# Patient Record
Sex: FEMALE | Race: WHITE | NOT HISPANIC OR LATINO | Employment: PART TIME | URBAN - METROPOLITAN AREA
[De-identification: names, ages, dates, MRNs, and addresses within clinical notes are randomized per-mention and may not be internally consistent; named-entity substitution may affect disease eponyms.]

---

## 2018-07-26 ENCOUNTER — OFFICE VISIT (OUTPATIENT)
Dept: NEUROLOGY | Facility: CLINIC | Age: 43
End: 2018-07-26

## 2018-07-26 VITALS
WEIGHT: 183 LBS | HEIGHT: 62 IN | DIASTOLIC BLOOD PRESSURE: 81 MMHG | SYSTOLIC BLOOD PRESSURE: 122 MMHG | BODY MASS INDEX: 33.68 KG/M2 | OXYGEN SATURATION: 98 % | HEART RATE: 100 BPM

## 2018-07-26 DIAGNOSIS — R20.2 PARESTHESIA: Primary | ICD-10-CM

## 2018-07-26 DIAGNOSIS — R53.1 WEAKNESS: ICD-10-CM

## 2018-07-26 PROCEDURE — 99204 OFFICE O/P NEW MOD 45 MIN: CPT | Performed by: NURSE PRACTITIONER

## 2018-07-26 RX ORDER — FLUOXETINE HYDROCHLORIDE 20 MG/1
20 CAPSULE ORAL DAILY
COMMUNITY

## 2018-07-26 RX ORDER — OXYCODONE HYDROCHLORIDE 10 MG/1
TABLET ORAL
COMMUNITY
Start: 2018-07-24

## 2018-07-26 RX ORDER — ACYCLOVIR 400 MG/1
TABLET ORAL
COMMUNITY
Start: 2018-07-14

## 2018-07-26 RX ORDER — TRIAMTERENE AND HYDROCHLOROTHIAZIDE 37.5; 25 MG/1; MG/1
1 CAPSULE ORAL EVERY MORNING
COMMUNITY

## 2018-07-26 RX ORDER — HYDROXYZINE PAMOATE 25 MG/1
1 CAPSULE ORAL DAILY
COMMUNITY
Start: 2018-06-12

## 2018-07-26 RX ORDER — MORPHINE SULFATE 15 MG/1
1 TABLET, FILM COATED, EXTENDED RELEASE ORAL DAILY
COMMUNITY
Start: 2018-07-24

## 2018-07-26 RX ORDER — FOLIC ACID 1 MG/1
1 TABLET ORAL DAILY
COMMUNITY
Start: 2018-07-24 | End: 2019-07-24

## 2018-08-14 ENCOUNTER — OFFICE VISIT (OUTPATIENT)
Dept: NEUROLOGY | Facility: CLINIC | Age: 43
End: 2018-08-14

## 2018-08-14 VITALS
OXYGEN SATURATION: 98 % | DIASTOLIC BLOOD PRESSURE: 80 MMHG | HEIGHT: 62 IN | SYSTOLIC BLOOD PRESSURE: 128 MMHG | BODY MASS INDEX: 34.04 KG/M2 | WEIGHT: 185 LBS | HEART RATE: 72 BPM

## 2018-08-14 DIAGNOSIS — Z53.21 PATIENT LEFT WITHOUT BEING SEEN: Primary | ICD-10-CM

## 2018-08-21 ENCOUNTER — HOSPITAL ENCOUNTER (OUTPATIENT)
Dept: MRI IMAGING | Facility: HOSPITAL | Age: 43
Discharge: HOME | End: 2018-08-21
Attending: NURSE PRACTITIONER

## 2018-08-28 ENCOUNTER — HOSPITAL ENCOUNTER (OUTPATIENT)
Dept: MRI IMAGING | Facility: HOSPITAL | Age: 43
Discharge: HOME | End: 2018-08-28
Attending: NURSE PRACTITIONER | Admitting: NURSE PRACTITIONER

## 2018-08-28 PROCEDURE — 0 GADOBENATE DIMEGLUMINE 529 MG/ML SOLUTION: Performed by: NURSE PRACTITIONER

## 2018-08-28 PROCEDURE — A9577 INJ MULTIHANCE: HCPCS | Performed by: NURSE PRACTITIONER

## 2018-08-28 PROCEDURE — 70553 MRI BRAIN STEM W/O & W/DYE: CPT

## 2018-08-28 RX ADMIN — GADOBENATE DIMEGLUMINE 17 ML: 529 INJECTION, SOLUTION INTRAVENOUS at 09:40

## 2018-08-29 ENCOUNTER — OFFICE VISIT (OUTPATIENT)
Dept: NEUROLOGY | Facility: CLINIC | Age: 43
End: 2018-08-29

## 2018-08-29 VITALS
HEART RATE: 101 BPM | WEIGHT: 184 LBS | HEIGHT: 62 IN | BODY MASS INDEX: 33.86 KG/M2 | DIASTOLIC BLOOD PRESSURE: 78 MMHG | OXYGEN SATURATION: 98 % | SYSTOLIC BLOOD PRESSURE: 118 MMHG

## 2018-08-29 DIAGNOSIS — G89.29 OTHER CHRONIC PAIN: ICD-10-CM

## 2018-08-29 DIAGNOSIS — R20.2 PARESTHESIAS: Primary | ICD-10-CM

## 2018-08-29 PROCEDURE — 99213 OFFICE O/P EST LOW 20 MIN: CPT | Performed by: NURSE PRACTITIONER

## 2019-02-25 ENCOUNTER — APPOINTMENT (OUTPATIENT)
Dept: LAB | Facility: HOSPITAL | Age: 44
End: 2019-02-25

## 2019-02-25 ENCOUNTER — OFFICE VISIT (OUTPATIENT)
Dept: NEUROLOGY | Facility: CLINIC | Age: 44
End: 2019-02-25

## 2019-02-25 VITALS
HEIGHT: 62 IN | SYSTOLIC BLOOD PRESSURE: 126 MMHG | WEIGHT: 191 LBS | BODY MASS INDEX: 35.15 KG/M2 | DIASTOLIC BLOOD PRESSURE: 74 MMHG

## 2019-02-25 DIAGNOSIS — R51.9 FACIAL PAIN: Primary | ICD-10-CM

## 2019-02-25 DIAGNOSIS — R29.2 HYPER REFLEXIA: ICD-10-CM

## 2019-02-25 LAB
ALBUMIN SERPL-MCNC: 4.6 G/DL (ref 3.2–4.8)
ALBUMIN/GLOB SERPL: 1.9 G/DL (ref 1.5–2.5)
ALP SERPL-CCNC: 118 U/L (ref 25–100)
ALT SERPL W P-5'-P-CCNC: 32 U/L (ref 7–40)
ANION GAP SERPL CALCULATED.3IONS-SCNC: 5 MMOL/L (ref 3–11)
AST SERPL-CCNC: 23 U/L (ref 0–33)
BASOPHILS # BLD AUTO: 0.03 10*3/MM3 (ref 0–0.2)
BASOPHILS NFR BLD AUTO: 0.3 % (ref 0–1)
BILIRUB SERPL-MCNC: 0.6 MG/DL (ref 0.3–1.2)
BUN BLD-MCNC: 11 MG/DL (ref 9–23)
BUN/CREAT SERPL: 15.3 (ref 7–25)
CALCIUM SPEC-SCNC: 9.4 MG/DL (ref 8.7–10.4)
CHLORIDE SERPL-SCNC: 103 MMOL/L (ref 99–109)
CO2 SERPL-SCNC: 28 MMOL/L (ref 20–31)
CREAT BLD-MCNC: 0.72 MG/DL (ref 0.6–1.3)
CRP SERPL-MCNC: 0.62 MG/DL (ref 0–1)
DEPRECATED RDW RBC AUTO: 51.9 FL (ref 37–54)
EOSINOPHIL # BLD AUTO: 0.05 10*3/MM3 (ref 0–0.3)
EOSINOPHIL NFR BLD AUTO: 0.6 % (ref 0–3)
ERYTHROCYTE [DISTWIDTH] IN BLOOD BY AUTOMATED COUNT: 13.7 % (ref 11.3–14.5)
ERYTHROCYTE [SEDIMENTATION RATE] IN BLOOD: 21 MM/HR (ref 0–20)
FOLATE SERPL-MCNC: >24 NG/ML (ref 3.2–20)
GFR SERPL CREATININE-BSD FRML MDRD: 88 ML/MIN/1.73
GLOBULIN UR ELPH-MCNC: 2.4 GM/DL
GLUCOSE BLD-MCNC: 90 MG/DL (ref 70–100)
HCT VFR BLD AUTO: 49.5 % (ref 34.5–44)
HGB BLD-MCNC: 16.5 G/DL (ref 11.5–15.5)
IMM GRANULOCYTES # BLD AUTO: 0.03 10*3/MM3 (ref 0–0.05)
IMM GRANULOCYTES NFR BLD AUTO: 0.3 % (ref 0–0.6)
LYMPHOCYTES # BLD AUTO: 2.54 10*3/MM3 (ref 0.6–4.8)
LYMPHOCYTES NFR BLD AUTO: 28.6 % (ref 24–44)
MCH RBC QN AUTO: 34.2 PG (ref 27–31)
MCHC RBC AUTO-ENTMCNC: 33.3 G/DL (ref 32–36)
MCV RBC AUTO: 102.7 FL (ref 80–99)
MONOCYTES # BLD AUTO: 0.57 10*3/MM3 (ref 0–1)
MONOCYTES NFR BLD AUTO: 6.4 % (ref 0–12)
NEUTROPHILS # BLD AUTO: 5.7 10*3/MM3 (ref 1.5–8.3)
NEUTROPHILS NFR BLD AUTO: 64.1 % (ref 41–71)
PLATELET # BLD AUTO: 256 10*3/MM3 (ref 150–450)
PMV BLD AUTO: 10.7 FL (ref 6–12)
POTASSIUM BLD-SCNC: 3.6 MMOL/L (ref 3.5–5.5)
PROT SERPL-MCNC: 7 G/DL (ref 5.7–8.2)
RBC # BLD AUTO: 4.82 10*6/MM3 (ref 3.89–5.14)
SODIUM BLD-SCNC: 136 MMOL/L (ref 132–146)
VIT B12 BLD-MCNC: 552 PG/ML (ref 211–911)
WBC NRBC COR # BLD: 8.89 10*3/MM3 (ref 3.5–10.8)

## 2019-02-25 PROCEDURE — 82746 ASSAY OF FOLIC ACID SERUM: CPT | Performed by: NURSE PRACTITIONER

## 2019-02-25 PROCEDURE — 99214 OFFICE O/P EST MOD 30 MIN: CPT | Performed by: NURSE PRACTITIONER

## 2019-02-25 PROCEDURE — 80053 COMPREHEN METABOLIC PANEL: CPT | Performed by: NURSE PRACTITIONER

## 2019-02-25 PROCEDURE — 85652 RBC SED RATE AUTOMATED: CPT | Performed by: NURSE PRACTITIONER

## 2019-02-25 PROCEDURE — 86140 C-REACTIVE PROTEIN: CPT | Performed by: NURSE PRACTITIONER

## 2019-02-25 PROCEDURE — 82607 VITAMIN B-12: CPT | Performed by: NURSE PRACTITIONER

## 2019-02-25 PROCEDURE — 83921 ORGANIC ACID SINGLE QUANT: CPT | Performed by: NURSE PRACTITIONER

## 2019-02-25 PROCEDURE — 85025 COMPLETE CBC W/AUTO DIFF WBC: CPT | Performed by: NURSE PRACTITIONER

## 2019-02-25 PROCEDURE — 36415 COLL VENOUS BLD VENIPUNCTURE: CPT | Performed by: NURSE PRACTITIONER

## 2019-02-25 RX ORDER — CARBAMAZEPINE 200 MG/1
TABLET ORAL
Qty: 60 TABLET | Refills: 5 | Status: SHIPPED | OUTPATIENT
Start: 2019-02-25

## 2019-02-25 NOTE — PROGRESS NOTES
Subjective:     Patient ID: Annette Ricketts is a 43 y.o. female.    CC:   Chief Complaint   Patient presents with   • Numbness       HPI:   History of Present Illness     Ms. Ricketts is here today for follow-up.  When I first began seeing her in July 2018 she came to our clinic for a second opinion.   She was previously seeing another neurologist for quite some time.  She told me that her symptoms began approximately 7-8 years ago when she was diagnosed with optic neuritis, she's had 3-4 bouts of optic neuritis over the years.  She reported along the same time that she started having vision issues she developed weakness and numbness in her right face, arm, leg and foot.  She reported that at times she can't feel anything at all at other times she feels a hot sensation on one or the other side of her body.  She also endorsed generalized muscle weakness and whole-body fatigue.  She reports numbness, burning, cramping and weakness of all of her muscles, symptoms do increase when she gets hot or when she has had not had enough sleep   She denied urinary incontinence,   She does have a history of chronic pain, she is currently on morphine sulfate 15 mg twice a day along with oxycodone 10 mg 3 times a day for chronic trigeminal neuralgia, she's been on these medicines for greater than 10 years.     2 weeks prior to first seeing me  she was sent to neuro-ophthalmology at  by another eye doctor, she saw Dr. Last, who ordered numerous lab tests all negative w/ excpetion of low folate, NMO negative) as well as MR of her orbit face and neck with and without contrast This scan was  done June 25, 2018 showing optic nerves to be mildly small there is a subtle increased signal within both optic nerves which could be due to past optic neuritis or other inflammatory process of note there is also increased signal in the T2 weighted images within the simone that were reportedly possibly related to chronic ischemic changes greater  "than expected in her age. MRI brain performed here showing no significant white matter disease, read as normal by radiology.   Further prior workup includes nerve conduction studies done of the lower extremities 1/22/2018 and read as normal no no suggestive nerve compromise.  Nerve conduction studies of the upper extremities recently done March 28, 2018 shows mild right carpal tunnel, otherwise unremarkable.  MRI of the brain obtained last in February 2016 read as normal with no demyelinating lesions noted, positive finding only for mucosal thickening of the left frontal scan right sphenoid sinusitis   She had been sent to rheumatology as well, they have told her symptoms are likely from fibromyalgia, she is very frustrated with this diagnosis.  She has also been seen at [facility] about 7-8 years ago, she states that lumbar puncture was done and \"normal\".  She is very concerned as her father does have multiple sclerosis.    I had easily attempted twice to get cervical spine MRI approved and this was denied twice.  Today she comes to me with complaints of left face electrical sensation from the lip extending up the chin to the cheek area 1 month ago.  She reports that this would occur for 5-10 minutes at a time intermittently, this lasted for about 4-5 days and resolved.  Last week she had a typical bout of trigeminal neuralgia in the right face described as a sensation starting behind the ear and leading to the entire right side of the face and described as a shooting electrical sensation as well.  She does currently take MS Contin as well as oxycodone which did not touch this type of pain, she did have some mild relief with high-dose ibuprofen 2-3 times a day.  This is improving, she has some mild residual tingling in the cheek.    Has never been on any daily medicines for traveled geminal neuralgia, she was started many years ago on MS Contin and oxycodone for this pain but again has never taken Trileptal or " Tegretol      The following portions of the patient's history were reviewed and updated as appropriate: allergies, current medications, past family history, past medical history, past social history, past surgical history and problem list.    Past Medical History:   Diagnosis Date   • CTS (carpal tunnel syndrome)    • Fibromyalgia, primary    • Migraine    • Trigeminal neuralgia    • Vision loss     optic neuritis       Past Surgical History:   Procedure Laterality Date   • KNEE SURGERY  1988   • UMBILICAL HERNIA REPAIR  1993       Social History     Socioeconomic History   • Marital status: Single     Spouse name: Not on file   • Number of children: Not on file   • Years of education: Not on file   • Highest education level: Not on file   Social Needs   • Financial resource strain: Not on file   • Food insecurity - worry: Not on file   • Food insecurity - inability: Not on file   • Transportation needs - medical: Not on file   • Transportation needs - non-medical: Not on file   Occupational History   • Not on file   Tobacco Use   • Smoking status: Current Every Day Smoker   • Smokeless tobacco: Never Used   Substance and Sexual Activity   • Alcohol use: Not on file   • Drug use: No   • Sexual activity: Not on file   Other Topics Concern   • Not on file   Social History Narrative   • Not on file       Family History   Problem Relation Age of Onset   • Neuropathy Mother    • Stroke Mother    • Diabetes Mother    • Migraines Mother    • Hypertension Father    • Neuropathy Father    • Heart disease Maternal Grandfather         Review of Systems   Constitutional: Negative.    HENT: Positive for ear pain. Negative for congestion, dental problem, drooling, ear discharge, facial swelling, hearing loss, nosebleeds, postnasal drip, rhinorrhea, sinus pressure, sinus pain, sneezing, sore throat, tinnitus, trouble swallowing and voice change.    Eyes: Negative.    Respiratory: Negative.    Cardiovascular: Negative.     Gastrointestinal: Negative.    Endocrine: Negative.    Genitourinary: Negative.    Musculoskeletal: Negative.    Skin: Negative.    Allergic/Immunologic: Negative.    Neurological: Positive for numbness and headaches. Negative for dizziness, tremors, seizures, syncope, facial asymmetry, speech difficulty, weakness and light-headedness.        FACIAL PAIN   Hematological: Negative.    Psychiatric/Behavioral: Negative.         Objective:    Neurologic Exam     Mental Status   Oriented to person, place, and time.   Attention: normal. Concentration: normal.   Speech: speech is normal   Level of consciousness: alert  Knowledge: consistent with education.   Able to read. Able to write. Normal comprehension.     Cranial Nerves   Cranial nerves II through XII intact.     CN III, IV, VI   Pupils are equal, round, and reactive to light.  Extraocular motions are normal.     Motor Exam   Muscle bulk: normal  Overall muscle tone: normal    Strength   Strength 5/5 throughout.     Sensory Exam   Mild decreased sensation of the left upper cheek area around the maxillary sinus region     Gait, Coordination, and Reflexes     Gait  Gait: normal    Reflexes   Right brachioradialis: 3+  Left brachioradialis: 3+  Right biceps: 3+  Left biceps: 3+  Right triceps: 3+  Left triceps: 3+  Right patellar: 2+  Left patellar: 2+  Right achilles: 2+  Left achilles: 2+  Right plantar: normal  Left plantar: normal  Right Arboleda: absent  Left Arboleda: absent      Physical Exam   Constitutional: She is oriented to person, place, and time. She appears well-developed and well-nourished. No distress.   HENT:   Head: Normocephalic and atraumatic.   Eyes: EOM are normal. Pupils are equal, round, and reactive to light.   Neck: Normal range of motion.   Cardiovascular: Normal rate.   Neurological: She is alert and oriented to person, place, and time. She has normal strength. Gait normal.   Reflex Scores:       Tricep reflexes are 3+ on the right side and  3+ on the left side.       Bicep reflexes are 3+ on the right side and 3+ on the left side.       Brachioradialis reflexes are 3+ on the right side and 3+ on the left side.       Patellar reflexes are 2+ on the right side and 2+ on the left side.       Achilles reflexes are 2+ on the right side and 2+ on the left side.  Psychiatric: Her speech is normal.   Vitals reviewed.      Assessment/Plan:       Annette was seen today for numbness.    Diagnoses and all orders for this visit:    Facial pain  -     C-reactive Protein  -     Sedimentation Rate  -     Comprehensive Metabolic Panel  -     CBC & Differential  -     Methylmalonic Acid, Serum  -     Vitamin B12  -     Folate  -     carBAMazepine (TEGretol) 200 MG tablet; Take 1/2 pill PO BID, may increase to whole pill BID after 2 weeks  -     CBC Auto Differential    Hyper reflexia  -     MRI Cervical Spine Without Contrast    Facial symptoms seem consistent with trigeminal neuralgia however this is been on both sides of the face, recent MRI scan the end of August 2018 showed no evidence of any white matter change or enhancing lesions.  She does have a history of trigeminal neuralgia in the past and this is actually why she is on high dose pain medications to this day.  For now we are going to try her on some Tegretol, will start low dose at 100 mg twice a day and she may increase to 200 mg twice a day after 2 weeks if needed.  I did review her [facility] lab results ordered through Dr. Last, and NMO negative, essentially all other stable with the exception of a low folate level, she does tell me that she took folic acid supplementation for a couple months but is not currently taking this.  I will check the above-noted labs, I will see her back in about 1 month, she will call me if needed.  I have asked her to call and give me an update in a few weeks after she starts the Tegretol to let me know how her symptoms are at that point.  Reviewed medications, potential side  effects and signs and symptoms to report. Discussed risk versus benefits of treatment plan with patient and/or family-including medications, labs and radiology that may be ordered. Addressed questions and concerns during visit. Patient and/or family verbalized understanding and agree with plan.  EMR Dragon/Transcription Disclaimer:  Much of this encounter note is an electronic transcription of spoken language to printed text. Electronic transcription of spoken language may permit erroneous words or phrases to be inadvertently transcribed. Although I have reviewed the note for such errors, some may still exist in this documentation.           Kayce Crabtree, APRN  2/25/2019

## 2019-02-26 ENCOUNTER — TELEPHONE (OUTPATIENT)
Dept: NEUROLOGY | Facility: CLINIC | Age: 44
End: 2019-02-26

## 2019-02-26 NOTE — PROGRESS NOTES
Please let her know her folate level is high  B12 level in normal range  CRP normal, sed rate mildly high which is non specific, these findings do not suggest temporal arteritis   Her hemoglobin and hematocrit are elevated.  This can be seen in smokers but needs recheck.  This can be done through PCP, fax copy of labs to her PCP please

## 2019-02-26 NOTE — TELEPHONE ENCOUNTER
----- Message from CAROLINE Montgomery sent at 2/26/2019  7:59 AM EST -----  Please let her know her folate level is high  B12 level in normal range  CRP normal, sed rate mildly high which is non specific, these findings do not suggest temporal arteritis   Her hemoglobin and hematocrit are elevated.  This can be seen in smokers but needs recheck.  This can be done through PCP, fax copy of labs to her PCP please

## 2019-02-28 NOTE — TELEPHONE ENCOUNTER
Pt is informed and verbalizes understanding,.  She would like to ask if she should stop the Folic Acid that she is taking.  Labs have been faxed to pcp.

## 2019-03-01 LAB
Lab: NORMAL
METHYLMALONATE SERPL-SCNC: 178 NMOL/L (ref 0–378)

## 2019-03-25 ENCOUNTER — TELEPHONE (OUTPATIENT)
Dept: NEUROLOGY | Facility: CLINIC | Age: 44
End: 2019-03-25

## 2019-04-23 ENCOUNTER — OFFICE VISIT (OUTPATIENT)
Dept: NEUROLOGY | Facility: CLINIC | Age: 44
End: 2019-04-23

## 2019-04-23 VITALS
HEART RATE: 105 BPM | OXYGEN SATURATION: 98 % | HEIGHT: 62 IN | SYSTOLIC BLOOD PRESSURE: 158 MMHG | WEIGHT: 190 LBS | DIASTOLIC BLOOD PRESSURE: 98 MMHG | BODY MASS INDEX: 34.96 KG/M2

## 2019-04-23 DIAGNOSIS — G50.0 TRIGEMINAL NEURALGIA OF RIGHT SIDE OF FACE: Primary | ICD-10-CM

## 2019-04-23 DIAGNOSIS — R29.2 HYPERREFLEXIA: ICD-10-CM

## 2019-04-23 PROCEDURE — 99214 OFFICE O/P EST MOD 30 MIN: CPT | Performed by: NURSE PRACTITIONER

## 2019-04-23 RX ORDER — PREDNISONE 10 MG/1
TABLET ORAL
Qty: 20 TABLET | Refills: 1 | Status: SHIPPED | OUTPATIENT
Start: 2019-04-23 | End: 2019-07-24

## 2019-04-23 NOTE — PROGRESS NOTES
Subjective:     Patient ID: Annette Ricketts is a 44 y.o. female.    CC:   Chief Complaint   Patient presents with   • Facial Pain       HPI:   History of Present Illness     Ms. Ricketts is here today for follow-up.  When I first began seeing her in July 2018 she came to our clinic for a second opinion.   She was previously seeing another neurologist for quite some time.  She told me that her symptoms began approximately 7-8 years ago when she was diagnosed with optic neuritis, she's had 3-4 bouts of optic neuritis over the years.  She reported along the same time that she started having vision issues she developed weakness and numbness in her right face, arm, leg and foot.  She reported that at times she can't feel anything at all at other times she feels a hot sensation on one or the other side of her body.  She also endorsed generalized muscle weakness and whole-body fatigue.  She reports numbness, burning, cramping and weakness of all of her muscles, symptoms do increase when she gets hot or when she has had not had enough sleep   She denied urinary incontinence,   She does have a history of chronic pain, she is currently on morphine sulfate 15 mg twice a day along with oxycodone 10 mg 3 times a day for chronic trigeminal neuralgia, she's been on these medicines for greater than 10 years.     2 weeks prior to first seeing me  she was sent to neuro-ophthalmology at  by another eye doctor, she saw Dr. Last, who ordered numerous lab tests all negative w/ excpetion of low folate, NMO negative) as well as MR of her orbit face and neck with and without contrast This scan was  done June 25, 2018 showing optic nerves to be mildly small there is a subtle increased signal within both optic nerves which could be due to past optic neuritis or other inflammatory process of note there is also increased signal in the T2 weighted images within the simone that were reportedly possibly related to chronic ischemic changes  "greater than expected in her age. MRI brain performed here showing no significant white matter disease, read as normal by radiology.   Further prior workup includes nerve conduction studies done of the lower extremities 1/22/2018 and read as normal no no suggestive nerve compromise.  Nerve conduction studies of the upper extremities recently done March 28, 2018 shows mild right carpal tunnel, otherwise unremarkable.  MRI of the brain obtained last in Oct 2018 read as normal with no demyelinating lesions noted, positive finding only for mucosal thickening of the left frontal scan right sphenoid sinusitis   She had been sent to rheumatology as well, they have told her symptoms are likely from fibromyalgia, she is very frustrated with this diagnosis.  She has also been seen at [facility] about 7-8 years ago, she states that lumbar puncture was done and \"normal\".  She is very concerned as her father does have multiple sclerosis.     I had previously attempted twice to get cervical spine MRI approved and this was denied twice. AFter last visit this was approved however pt missed MRI appt on March 3rd.    Today she comes to me for follow up on left face electrical sensation from the lip extending up the chin to the cheek area.  She reports that this would occur for 5-10 minutes at a time intermittently, this lasted for about 4-5 days and resolved. .  She does currently take MS Contin as well as oxycodone which did not touch this type of pain, she did have some mild relief with high-dose ibuprofen 2-3 times a day.     Has never been on any daily medicines for trigeminal neuralgia, she was started many years ago on MS Contin and oxycodone for this pain but again has never taken Trileptal or Tegretol.  At last visit I did start Tegretol with instruction on upper titration, she tells me she took this for 2 weeks and felt better so she stopped the medication.  She has since had recurrent bouts of right facial pain consistent " with trigeminal neuralgia.  She had no adverse side effects from the Tegretol, she did start this back about a week ago.     The following portions of the patient's history were reviewed and updated as appropriate: allergies, current medications, past family history, past medical history, past social history, past surgical history and problem list.    Past Medical History:   Diagnosis Date   • CTS (carpal tunnel syndrome)    • Fibromyalgia, primary    • Migraine    • Trigeminal neuralgia    • Vision loss     optic neuritis       Past Surgical History:   Procedure Laterality Date   • KNEE SURGERY  1988   • UMBILICAL HERNIA REPAIR  1993       Social History     Socioeconomic History   • Marital status: Single     Spouse name: Not on file   • Number of children: Not on file   • Years of education: Not on file   • Highest education level: Not on file   Tobacco Use   • Smoking status: Current Every Day Smoker   • Smokeless tobacco: Never Used   Substance and Sexual Activity   • Drug use: No       Family History   Problem Relation Age of Onset   • Neuropathy Mother    • Stroke Mother    • Diabetes Mother    • Migraines Mother    • Hypertension Father    • Neuropathy Father    • Heart disease Maternal Grandfather         Review of Systems   Constitutional: Negative.    HENT: Positive for ear pain. Negative for dental problem, drooling, tinnitus, trouble swallowing and voice change.    Eyes: Negative.    Respiratory: Negative.    Cardiovascular: Negative.    Gastrointestinal: Negative.    Endocrine: Negative.    Genitourinary: Negative.    Musculoskeletal: Negative.    Skin: Negative.    Allergic/Immunologic: Negative.    Neurological: Negative for dizziness, tremors, seizures, syncope, facial asymmetry, speech difficulty, weakness, light-headedness, numbness and headaches.        Right facial pain   Hematological: Negative.    Psychiatric/Behavioral: Negative.         Objective:    Neurologic Exam     Mental Status    Oriented to person, place, and time.   Attention: normal. Concentration: normal.   Speech: speech is normal   Level of consciousness: alert  Knowledge: consistent with education.   Able to read. Able to write. Normal comprehension.     Cranial Nerves   Cranial nerves II through XII intact.     CN III, IV, VI   Pupils are equal, round, and reactive to light.  Extraocular motions are normal.     Motor Exam   Muscle bulk: normal  Overall muscle tone: normal  Right arm pronator drift: absent  Left arm pronator drift: absent    Sensory Exam   Light touch normal.   No sensation abnormality to the face     Gait, Coordination, and Reflexes     Gait  Gait: normal    Coordination   Finger to nose coordination: normal    Tremor   Resting tremor: absent  Intention tremor: absent  Action tremor: absentHyperreflexia upper extremities       Physical Exam   Constitutional: She is oriented to person, place, and time. She appears well-developed and well-nourished. No distress.   HENT:   Head: Normocephalic and atraumatic.   Eyes: Conjunctivae and EOM are normal. Pupils are equal, round, and reactive to light. No scleral icterus.   Neck: Normal range of motion. Neck supple.   Pulmonary/Chest: Effort normal. No respiratory distress.   Neurological: She is alert and oriented to person, place, and time. She has a normal Finger-Nose-Finger Test. Gait normal.   Skin: Skin is warm. Capillary refill takes less than 2 seconds.   Psychiatric: She has a normal mood and affect. Her speech is normal and behavior is normal. Judgment and thought content normal.   Vitals reviewed.      Assessment/Plan:       Annette was seen today for facial pain.    Diagnoses and all orders for this visit:    Trigeminal neuralgia of right side of face  -     predniSONE (DELTASONE) 10 MG tablet; 4/dx2d, 3/dx2d, 2/dx2d, 1/dx2d    Hyperreflexia  -     MRI Cervical Spine Without Contrast    I have recommended Ms. Ricketts continue the Tegretol indefinitely as she was  feeling better after starting this at last visit.  She has started this about a week ago and she is given instructions on upward titration.  Prednisone pack for acute symptoms given today, she is tolerated prednisone without issue in the past.  I have reordered MRI of the cervical spine as she missed her MRI appointment in March, this is ordered and consideration of spinal stenosis due to hyperreflexia  Of instructed her to let me know after she finishes prednisone how she is doing in regards of her trigeminal neuralgia, we have also discussed Tegretol dosing and she will call me with any questions.  Reviewed medications, potential side effects and signs and symptoms to report. Discussed risk versus benefits of treatment plan with patient and/or family-including medications, labs and radiology that may be ordered. Addressed questions and concerns during visit. Patient and/or family verbalized understanding and agree with plan.    Blood pressure also is elevated today, recommend that she monitor this, let her primary care know of elevated reading today  EMR Dragon/Transcription Disclaimer:  Much of this encounter note is an electronic transcription of spoken language to printed text. Electronic transcription of spoken language may permit erroneous words or phrases to be inadvertently transcribed. Although I have reviewed the note for such errors, some may still exist in this documentation.           Kayce Crabtree, APRN  4/23/2019

## 2019-07-24 ENCOUNTER — APPOINTMENT (OUTPATIENT)
Dept: LAB | Facility: HOSPITAL | Age: 44
End: 2019-07-24

## 2019-07-24 ENCOUNTER — OFFICE VISIT (OUTPATIENT)
Dept: NEUROLOGY | Facility: CLINIC | Age: 44
End: 2019-07-24

## 2019-07-24 VITALS
BODY MASS INDEX: 33.49 KG/M2 | HEIGHT: 62 IN | HEART RATE: 127 BPM | OXYGEN SATURATION: 98 % | SYSTOLIC BLOOD PRESSURE: 122 MMHG | WEIGHT: 182 LBS | DIASTOLIC BLOOD PRESSURE: 78 MMHG

## 2019-07-24 DIAGNOSIS — R20.2 PARESTHESIA: ICD-10-CM

## 2019-07-24 DIAGNOSIS — G50.0 TRIGEMINAL NEURALGIA: ICD-10-CM

## 2019-07-24 DIAGNOSIS — R29.2 HYPOREFLEXIA: Primary | ICD-10-CM

## 2019-07-24 DIAGNOSIS — R53.1 WEAKNESS: ICD-10-CM

## 2019-07-24 LAB
ALBUMIN SERPL-MCNC: 4.6 G/DL (ref 3.5–5.2)
ALBUMIN/GLOB SERPL: 1.4 G/DL
ALP SERPL-CCNC: 114 U/L (ref 39–117)
ALT SERPL W P-5'-P-CCNC: 25 U/L (ref 1–33)
ANION GAP SERPL CALCULATED.3IONS-SCNC: 15.3 MMOL/L (ref 5–15)
AST SERPL-CCNC: 27 U/L (ref 1–32)
BILIRUB SERPL-MCNC: 0.3 MG/DL (ref 0.2–1.2)
BUN BLD-MCNC: 7 MG/DL (ref 6–20)
BUN/CREAT SERPL: 11.5 (ref 7–25)
CALCIUM SPEC-SCNC: 9.7 MG/DL (ref 8.6–10.5)
CARBAMAZEPINE SERPL-MCNC: <2 MCG/ML (ref 4–12)
CHLORIDE SERPL-SCNC: 101 MMOL/L (ref 98–107)
CK SERPL-CCNC: 97 U/L (ref 20–180)
CO2 SERPL-SCNC: 25.7 MMOL/L (ref 22–29)
CREAT BLD-MCNC: 0.61 MG/DL (ref 0.57–1)
GFR SERPL CREATININE-BSD FRML MDRD: 107 ML/MIN/1.73
GLOBULIN UR ELPH-MCNC: 3.3 GM/DL
GLUCOSE BLD-MCNC: 92 MG/DL (ref 65–99)
POTASSIUM BLD-SCNC: 3.4 MMOL/L (ref 3.5–5.2)
PROT SERPL-MCNC: 7.9 G/DL (ref 6–8.5)
SODIUM BLD-SCNC: 142 MMOL/L (ref 136–145)

## 2019-07-24 PROCEDURE — 86256 FLUORESCENT ANTIBODY TITER: CPT | Performed by: NURSE PRACTITIONER

## 2019-07-24 PROCEDURE — 86334 IMMUNOFIX E-PHORESIS SERUM: CPT | Performed by: NURSE PRACTITIONER

## 2019-07-24 PROCEDURE — 84165 PROTEIN E-PHORESIS SERUM: CPT | Performed by: NURSE PRACTITIONER

## 2019-07-24 PROCEDURE — 84155 ASSAY OF PROTEIN SERUM: CPT | Performed by: NURSE PRACTITIONER

## 2019-07-24 PROCEDURE — 83519 RIA NONANTIBODY: CPT | Performed by: NURSE PRACTITIONER

## 2019-07-24 PROCEDURE — 82784 ASSAY IGA/IGD/IGG/IGM EACH: CPT | Performed by: NURSE PRACTITIONER

## 2019-07-24 PROCEDURE — 86235 NUCLEAR ANTIGEN ANTIBODY: CPT | Performed by: NURSE PRACTITIONER

## 2019-07-24 PROCEDURE — 86702 HIV-2 ANTIBODY: CPT | Performed by: NURSE PRACTITIONER

## 2019-07-24 PROCEDURE — 80053 COMPREHEN METABOLIC PANEL: CPT | Performed by: NURSE PRACTITIONER

## 2019-07-24 PROCEDURE — 99214 OFFICE O/P EST MOD 30 MIN: CPT | Performed by: NURSE PRACTITIONER

## 2019-07-24 PROCEDURE — 82550 ASSAY OF CK (CPK): CPT | Performed by: NURSE PRACTITIONER

## 2019-07-24 PROCEDURE — 36415 COLL VENOUS BLD VENIPUNCTURE: CPT | Performed by: NURSE PRACTITIONER

## 2019-07-24 PROCEDURE — 80156 ASSAY CARBAMAZEPINE TOTAL: CPT | Performed by: NURSE PRACTITIONER

## 2019-07-24 PROCEDURE — 86701 HIV-1ANTIBODY: CPT | Performed by: NURSE PRACTITIONER

## 2019-07-24 NOTE — PROGRESS NOTES
Subjective:     Patient ID: Annette Ricketts is a 44 y.o. female.    CC:   Chief Complaint   Patient presents with   • Trigeminal Neuralgia       HPI:   History of Present Illness     Ms. Ricketts is here today for follow-up.  When I first began seeing her in 2018 she came to our clinic for a second opinion.   She was previously seeing another neurologist for quite some time.  She told me that her symptoms began approximately 7-8 years ago when she was diagnosed with optic neuritis, she's had 3-4 bouts of optic neuritis over the years.  She reported along the same time that she started having vision issues she developed weakness and numbness in her right face (no visible weakness or ptosis), arm, leg and foot.  She reported that at times she can't feel anything at all at other times she feels a hot sensation on one or the other side of her body.  She also endorsed generalized muscle weakness and whole-body fatigue.  She reports numbness, burning, cramping and weakness of all of her muscles, symptoms do increase when she gets hot or when she has had not had enough sleep   She denied urinary incontinence,   She does have a history of chronic pain, she is currently on morphine sulfate 15 mg twice a day along with oxycodone 10 mg 3 times a day for chronic trigeminal neuralgia, she's been on these medicines for greater than 10 years.     2 weeks prior to first seeing me  she was sent to neuro-ophthalmology at  by another eye doctor, she saw Dr. Last, who ordered numerous lab tests all negative w/ excpetion of low folate, NMO negative) as well as MR of her orbit face and neck with and without contrast This scan was  done June 25, 2018 showing optic nerves to be mildly small there is a subtle increased signal within both optic nerves which could be due to past optic neuritis or other inflammatory process of note there is also increased signal in the T2 weighted images within the simone that were reportedly possibly  "related to chronic ischemic changes greater than expected in her age. MRI brain performed here showing no significant white matter disease, read as normal by radiology.  She has follow up with Dr. Last in October.  She had no recent episodes of ON  Further prior workup includes nerve conduction studies done of the lower extremities 1/22/2018 and read as normal no no suggestive nerve compromise.  Nerve conduction studies of the upper extremities recently done March 28, 2018 shows mild right carpal tunnel, otherwise unremarkable.  MRI of the brain obtained last in Oct 2018 read as normal with no demyelinating lesions noted, positive finding only for mucosal thickening of the left frontal scan right sphenoid sinusitis   She had been sent to rheumatology as well, they have told her symptoms are likely from fibromyalgia, she is very frustrated with this diagnosis.  She has also been seen at [facility] about 7-8 years ago, she states that lumbar puncture was done and \"normal\".  She is very concerned as her father does have multiple sclerosis.     I had previously attempted twice to get cervical spine MRI approved and this was denied twice. AFter last visit this was approved however pt missed MRI appt on March 3rd.     Last visit comes to me for follow up on left face electrical sensation from the lip extending up the chin to the cheek area.  She reports that this would occur for 5-10 minutes at a time intermittently, this lasted for about 4-5 days and resolved. .  She does currently take MS Contin as well as oxycodone which did not touch this type of pain, she did have some mild relief with high-dose ibuprofen 2-3 times a day.     Has never been on any daily medicines for trigeminal neuralgia, she was started many years ago on MS Contin and oxycodone for this pain but again has never taken Trileptal or Tegretol.  At last visit I did start Tegretol with instruction on upper titration, she tells me she took this for 2 " weeks and felt better so she stopped the medication.  She has since had recurrent bouts of right facial pain consistent with trigeminal neuralgia.  She had no adverse side effects from the Tegretol, she is currently taking this BID and doing well and trigeminal neuralgia s/s are resolved.     The following portions of the patient's history were reviewed and updated as appropriate: allergies, current medications, past family history, past medical history, past social history, past surgical history and problem list.    Past Medical History:   Diagnosis Date   • CTS (carpal tunnel syndrome)    • Fibromyalgia, primary    • Migraine    • Trigeminal neuralgia    • Vision loss     optic neuritis       Past Surgical History:   Procedure Laterality Date   • KNEE SURGERY  1988   • UMBILICAL HERNIA REPAIR  1993       Social History     Socioeconomic History   • Marital status: Single     Spouse name: Not on file   • Number of children: Not on file   • Years of education: Not on file   • Highest education level: Not on file   Tobacco Use   • Smoking status: Current Every Day Smoker   • Smokeless tobacco: Never Used   Substance and Sexual Activity   • Drug use: No       Family History   Problem Relation Age of Onset   • Neuropathy Mother    • Stroke Mother    • Diabetes Mother    • Migraines Mother    • Hypertension Father    • Neuropathy Father    • Heart disease Maternal Grandfather         Review of Systems   Constitutional: Positive for fatigue.   HENT: Negative.  Negative for tinnitus, trouble swallowing and voice change.    Eyes: Negative.    Respiratory: Negative.    Cardiovascular: Negative.    Gastrointestinal: Negative.    Genitourinary: Negative.    Musculoskeletal: Positive for arthralgias and neck pain.   Neurological: Positive for weakness and numbness. Negative for dizziness, tremors, seizures, syncope, facial asymmetry, speech difficulty, light-headedness and headaches.   Hematological: Negative.     Psychiatric/Behavioral: Negative.         Objective:    Neurologic Exam     Mental Status   Oriented to person, place, and time.   Attention: normal. Concentration: normal.   Speech: speech is normal   Level of consciousness: alert  Knowledge: consistent with education.   Able to read. Able to write. Normal comprehension.     Cranial Nerves   Cranial nerves II through XII intact.     CN II   Visual fields full to confrontation.   Right visual field deficit: none  Left visual field deficit: none     CN III, IV, VI   Pupils are equal, round, and reactive to light.  Extraocular motions are normal.   Right pupil: Shape: regular. Reactivity: brisk. Consensual response: intact. Accommodation: intact.   Left pupil: Shape: regular. Reactivity: brisk. Consensual response: intact. Accommodation: intact.   CN III: no CN III palsy  CN VI: no CN VI palsy  Nystagmus: none   Upgaze: normal  Downgaze: normal    CN V   Facial sensation intact.     CN VII   Facial expression full, symmetric.     CN VIII   CN VIII normal.     CN IX, X   CN IX normal.   CN X normal.     CN XI   CN XI normal.     CN XII   CN XII normal.     Motor Exam   Muscle bulk: normal  Overall muscle tone: normal  Right arm tone: normal  Left arm tone: normal  Right arm pronator drift: absent  Left arm pronator drift: absent  Right leg tone: normal  Left leg tone: normal    Strength   Strength 5/5 throughout.     Sensory Exam   Light touch normal.     Gait, Coordination, and Reflexes     Gait  Gait: normal    Coordination   Romberg: negative  Finger to nose coordination: normal  Heel to shin coordination: normal  Tandem walking coordination: normal    Tremor   Resting tremor: absent  Intention tremor: absent  Action tremor: absent    Reflexes   Reflexes 2+ except as noted.   Right brachioradialis: 1+  Left brachioradialis: 2+  Right biceps: 1+  Left biceps: 2+  Right triceps: 1+  Left triceps: 2+  Right patellar: 2+  Left patellar: 2+  Right achilles: 2+  Left  achilles: 2+  Right : 2+  Left : 2+  Right plantar: normal  Left plantar: normal  Right Arboleda: absent  Left Arboleda: absent      Physical Exam   Constitutional: She is oriented to person, place, and time. She appears well-developed and well-nourished.   HENT:   Head: Normocephalic and atraumatic.   Eyes: Conjunctivae and EOM are normal. Pupils are equal, round, and reactive to light. No scleral icterus.   Neck: Normal range of motion. Neck supple.   Pulmonary/Chest: Effort normal. No respiratory distress.   Neurological: She is alert and oriented to person, place, and time. She has normal strength. She has a normal Finger-Nose-Finger Test, a normal Heel to Shin Test, a normal Romberg Test and a normal Tandem Gait Test. Gait normal.   Reflex Scores:       Tricep reflexes are 1+ on the right side and 2+ on the left side.       Bicep reflexes are 1+ on the right side and 2+ on the left side.       Brachioradialis reflexes are 1+ on the right side and 2+ on the left side.       Patellar reflexes are 2+ on the right side and 2+ on the left side.       Achilles reflexes are 2+ on the right side and 2+ on the left side.  Skin: Skin is warm. Capillary refill takes less than 2 seconds.   Psychiatric: She has a normal mood and affect. Her speech is normal and behavior is normal. Judgment and thought content normal.   Vitals reviewed.      Assessment/Plan:       Annette was seen today for trigeminal neuralgia.    Diagnoses and all orders for this visit:    Hyporeflexia  -     CK  -     MRI Cervical Spine Without Contrast    Paresthesia  -     DOLLY + PE  -     HIV 1 / 2 (HIV-1 / 2) Antibody Differentiation  -     MRI Cervical Spine Without Contrast    Weakness  -     DOLLY + PE  -     HIV 1 / 2 (HIV-1 / 2) Antibody Differentiation  -     CK  -     Acetylcholine Receptor, Binding  -     Acetylcholine Receptor, Blocking  -     Acetylcholine Receptor, Modulating  -     Anti-Hu Antibodies  -     Anti-Chelsea 1 Antibody, IgG  -      Musk Antibody  -     MRI Cervical Spine Without Contrast    Trigeminal neuralgia  -     Carbamazepine Level, Total  -     Comprehensive Metabolic Panel    Ms. Ricketts certainly has some puzzling symptoms that are intermittent.  She has seen several neurologists in the past, she even saw [facility] and had lumbar puncture studies along with numerous other testing which was unremarkable.  Immediately prior to coming to me she had had recent nerve conduction studies in 2018 which were essentially unremarkable with the exception of mild carpal tunnel.  I have attempted many times to get an MRI of the cervical spine approved, I did successfully get this approved once however she no showed appointment.  Due to her decreased reflex in the right arm I would like to again order MRI of the cervical spine to rule out cord compression which could cause myelopathy.  Myasthenia would certainly be unlikely as she is never had ptosis however I would like to go ahead and get these labs as well.  Consider repeat nerve conduction studies.  Trigeminal neuralgia stable on Tegretol, will check level and CMP.  Reviewed medications, potential side effects and signs and symptoms to report. Discussed risk versus benefits of treatment plan with patient and/or family-including medications, labs and radiology that may be ordered. Addressed questions and concerns during visit. Patient and/or family verbalized understanding and agree with plan.  EMR Dragon/Transcription Disclaimer:  Much of this encounter note is an electronic transcription of spoken language to printed text. Electronic transcription of spoken language may permit erroneous words or phrases to be inadvertently transcribed. Although I have reviewed the note for such errors, some may still exist in this documentation.           Kayce Crabtree, APRN  7/24/2019

## 2019-07-25 LAB
ALBUMIN SERPL-MCNC: 3.8 G/DL (ref 2.9–4.4)
ALBUMIN/GLOB SERPL: 1.1 {RATIO} (ref 0.7–1.7)
ALPHA1 GLOB FLD ELPH-MCNC: 0.3 G/DL (ref 0–0.4)
ALPHA2 GLOB SERPL ELPH-MCNC: 0.9 G/DL (ref 0.4–1)
B-GLOBULIN SERPL ELPH-MCNC: 1.3 G/DL (ref 0.7–1.3)
ENA JO1 AB SER-ACNC: <0.2 AI (ref 0–0.9)
GAMMA GLOB SERPL ELPH-MCNC: 1 G/DL (ref 0.4–1.8)
GLOBULIN SER CALC-MCNC: 3.6 G/DL (ref 2.2–3.9)
HIV 1 & 2 AB SERPLBLD IA.RAPID: NEGATIVE
HIV 2 AB SERPLBLD QL IA.RAPID: NEGATIVE
HIV1 AB SERPLBLD QL IA.RAPID: NEGATIVE
IGA SERPL-MCNC: 406 MG/DL (ref 87–352)
IGG SERPL-MCNC: 950 MG/DL (ref 700–1600)
IGM SERPL-MCNC: 87 MG/DL (ref 26–217)
INTERPRETATION SERPL IEP-IMP: ABNORMAL
Lab: ABNORMAL
M-SPIKE: ABNORMAL G/DL
PROT SERPL-MCNC: 7.4 G/DL (ref 6–8.5)

## 2019-07-25 NOTE — PROGRESS NOTES
Please let her know her HIV was negative  Tegretol level is very low, essentially nonexistent, if she taking this medication regularly?  CK levels in normal range  Potassium is just a bit low, increase potassium in diet with things such as orange juice and bananas, have recheck drawn through primary care  Other labs are pending, we will call her when they are resulted

## 2019-07-26 LAB — ACHR AB SER-SCNC: <0.03 NMOL/L (ref 0–0.24)

## 2019-07-29 LAB — HU1 AB TITR SER: NORMAL TITER

## 2019-07-30 LAB
ACHR MOD AB/ACHR TOTAL SFR SER: <12 % (ref 0–20)
MUSK ANTIBODY: <1 U/ML

## 2019-08-06 LAB — ACHR BLOCK AB/ACHR TOTAL SFR SER: 22 % (ref 0–25)
